# Patient Record
Sex: FEMALE | Race: WHITE | Employment: OTHER | URBAN - NONMETROPOLITAN AREA
[De-identification: names, ages, dates, MRNs, and addresses within clinical notes are randomized per-mention and may not be internally consistent; named-entity substitution may affect disease eponyms.]

---

## 2017-09-27 ENCOUNTER — HOSPITAL ENCOUNTER (OUTPATIENT)
Dept: MAMMOGRAPHY | Age: 62
Discharge: HOME | End: 2017-09-27
Payer: COMMERCIAL

## 2017-09-27 DIAGNOSIS — Z12.31 ENCOUNTER FOR MAMMOGRAM TO ESTABLISH BASELINE MAMMOGRAM: ICD-10-CM

## 2017-09-27 PROCEDURE — G0202 SCR MAMMO BI INCL CAD: HCPCS

## 2022-12-11 ENCOUNTER — OFFICE VISIT (OUTPATIENT)
Dept: PRIMARY CARE CLINIC | Age: 67
End: 2022-12-11
Payer: MEDICARE

## 2022-12-11 ENCOUNTER — HOSPITAL ENCOUNTER (OUTPATIENT)
Age: 67
Discharge: HOME | End: 2022-12-13
Payer: MEDICARE

## 2022-12-11 ENCOUNTER — HOSPITAL ENCOUNTER (OUTPATIENT)
Dept: GENERAL RADIOLOGY | Age: 67
Discharge: HOME | End: 2022-12-13
Payer: MEDICARE

## 2022-12-11 VITALS
HEART RATE: 58 BPM | DIASTOLIC BLOOD PRESSURE: 84 MMHG | BODY MASS INDEX: 21.07 KG/M2 | SYSTOLIC BLOOD PRESSURE: 132 MMHG | RESPIRATION RATE: 16 BRPM | HEIGHT: 68 IN | WEIGHT: 139 LBS | TEMPERATURE: 97.3 F | OXYGEN SATURATION: 99 %

## 2022-12-11 DIAGNOSIS — M25.571 ACUTE RIGHT ANKLE PAIN: ICD-10-CM

## 2022-12-11 DIAGNOSIS — M79.671 RIGHT FOOT PAIN: ICD-10-CM

## 2022-12-11 DIAGNOSIS — M79.671 RIGHT FOOT PAIN: Primary | ICD-10-CM

## 2022-12-11 PROCEDURE — 99212 OFFICE O/P EST SF 10 MIN: CPT | Performed by: NURSE PRACTITIONER

## 2022-12-11 PROCEDURE — 73630 X-RAY EXAM OF FOOT: CPT

## 2022-12-11 RX ORDER — ATENOLOL 50 MG/1
TABLET ORAL
COMMUNITY
Start: 2022-10-10

## 2022-12-11 SDOH — ECONOMIC STABILITY: FOOD INSECURITY: WITHIN THE PAST 12 MONTHS, YOU WORRIED THAT YOUR FOOD WOULD RUN OUT BEFORE YOU GOT MONEY TO BUY MORE.: NEVER TRUE

## 2022-12-11 SDOH — ECONOMIC STABILITY: FOOD INSECURITY: WITHIN THE PAST 12 MONTHS, THE FOOD YOU BOUGHT JUST DIDN'T LAST AND YOU DIDN'T HAVE MONEY TO GET MORE.: NEVER TRUE

## 2022-12-11 ASSESSMENT — PATIENT HEALTH QUESTIONNAIRE - PHQ9
1. LITTLE INTEREST OR PLEASURE IN DOING THINGS: 0
SUM OF ALL RESPONSES TO PHQ QUESTIONS 1-9: 0
SUM OF ALL RESPONSES TO PHQ QUESTIONS 1-9: 0
SUM OF ALL RESPONSES TO PHQ9 QUESTIONS 1 & 2: 0
2. FEELING DOWN, DEPRESSED OR HOPELESS: 0
SUM OF ALL RESPONSES TO PHQ QUESTIONS 1-9: 0
SUM OF ALL RESPONSES TO PHQ QUESTIONS 1-9: 0

## 2022-12-11 ASSESSMENT — SOCIAL DETERMINANTS OF HEALTH (SDOH): HOW HARD IS IT FOR YOU TO PAY FOR THE VERY BASICS LIKE FOOD, HOUSING, MEDICAL CARE, AND HEATING?: NOT HARD AT ALL

## 2022-12-11 NOTE — PROGRESS NOTES
[address] [facility] A department of [facility]  Phone: [phone]  Fax: [phone]      Taylor Willson is a 74 y.o. female who presents to the [facility] or [address] [place] [address] clinic today for her medical conditions/complaints as noted below. Taylor Willson is c/o of Foot Pain (Had a fall yesterday due to foot \"feeling numb\". Happened suddenly and without warning. Able to walk straight, but when foot twists-the pain significantly increases)    ankle pan along lateral aspect malleolus from fall yesterday. Able to bear weight but later movements cause pain. Assessment and Plan     1. Right foot pain  ankle pan along lateral aspect malleolus from fall yesterday. Able to bear weight but later movements cause pain. X rays obtained. Reviewed X-rays with patient. No acute findings. Discussed NSAIDs, and compression socks. Advised to follow up with PCP if no improvement in pain for PT in 2-3 days. - XR FOOT RIGHT (MIN 3 VIEWS); Future      Subjective: Foot was asleep yesterday after sitting for a while and when she went to walk it caused her to fall. No other injuries. Able to walk on the foot today and bear weight but the  lateral motions cause discomfort. Foot Pain   The quality of the pain is described as aching. Associated symptoms include a limited range of motion. The symptoms are aggravated by activity. She has tried NSAIDS and cold for the symptoms. The treatment provided mild relief. Her past medical history is significant for rheumatoid arthritis. RA but not on medications   Review of Systems   Musculoskeletal:  Positive for joint swelling (slight on right later ankle). All other systems reviewed and are negative. Past Medical History:   Past Medical History:   Diagnosis Date    Cancer ([address]     Endometrial, Stage 1.  Had Hysterectomy    Hypertension     RA (rheumatoid arthritis) ([address]         Past Surgical History:  has a past surgical history that includes Tonsillectomy and Hysterectomy, total abdominal (2018). Allergies: No Known Allergies    Social History:  reports that she has never smoked. She has never used smokeless tobacco. She reports that she does not drink alcohol and does not use drugs. Objective:     Vitals:    12/11/22 1256   BP: 132/84   Site: Right Upper Arm   Position: Sitting   Cuff Size: Medium Adult   Pulse: 58   Resp: 16   Temp: 97.3 °F (36.3 °C)   TempSrc: Tympanic   SpO2: 99%   Weight: 139 lb (63 kg)   Height: 5' 8\" (1.727 m)     Body mass index is 21.13 kg/m². /84 (Site: Right Upper Arm, Position: Sitting, Cuff Size: Medium Adult)   Pulse 58   Temp 97.3 °F (36.3 °C) (Tympanic)   Resp 16   Ht 5' 8\" (1.727 m)   Wt 139 lb (63 kg)   LMP 05/01/2014   SpO2 99%   BMI 21.13 kg/m²   Physical Exam  Vitals and nursing note reviewed. Constitutional:       General: She is not in acute distress. Appearance: She is normal weight. She is not ill-appearing. Eyes:      Pupils: Pupils are equal, round, and reactive to light. Cardiovascular:      Rate and Rhythm: Normal rate. Pulses: Normal pulses. Pulmonary:      Effort: Pulmonary effort is normal.   Musculoskeletal:         General: Tenderness present. No swelling. Cervical back: Normal range of motion. Right ankle: Swelling (minimal along lateral aspect of malleolus) present. No lacerations. Tenderness present over the lateral malleolus. No medial malleolus, CF ligament, posterior TF ligament or base of 5th metatarsal tenderness. Normal range of motion. Legs:         Feet:    Skin:     General: Skin is warm and dry. Capillary Refill: Capillary refill takes less than 2 seconds. Neurological:      General: No focal deficit present. Mental Status: She is alert.    Psychiatric:         Mood and Affect: Mood normal.         Behavior: Behavior normal.         Judgment: Judgment normal.     XR FOOT RIGHT (MIN 3 VIEWS)  Narrative: EXAMINATION:  THREE XRAY VIEWS OF THE RIGHT FOOT    12/11/2022 1:28 pm    COMPARISON:  None. HISTORY:  ORDERING SYSTEM PROVIDED HISTORY: Right foot pain  TECHNOLOGIST PROVIDED HISTORY:  ankle pan along lateral aspect malleolus from fall yesterday. Able to bear  weight but later movements cause pain. Reason for Exam: Lateral proximal foot pain/bruising/swelling since she  twisted foot yesterday    60-year-old female with right foot pain after a fall    FINDINGS:  Osseous alignment is normal.  Mild degenerative changes of the 3rd MTP joint. No marginal erosions are identified. No acute fracture or gross dislocation  is seen. The medial and middle cuneiforms demonstrate proper alignment with the base  of the 1st and 2nd metatarsals respectively. No tibiotalar joint effusion is seen. Boehler's angle is maintained. Impression: 1. Mild degenerative changes of the 3rd MTP joint. 2. No acute fracture or dislocation. Discussed exam, POCT findings, plan of care, and follow-up at length with patient and/or their caregiver. Reviewed all prescribed and recommended medications, administration and side effects. Encouraged patient to follow up with PCP or return to the clinic for no improvement and or worsening of symptoms. All questions were addressed and answered with verbalization of understanding. The patient and/or the caregiver was agreeable with the plan.      Electronically signed by LAYNE Rabago CNP on 12/11/2022 at 1:11 PM

## 2023-03-04 LAB
CHOLESTEROL, TOTAL: 234 MG/DL
CHOLESTEROL/HDL RATIO: 3.8
HDLC SERPL-MCNC: 62 MG/DL (ref 35–70)
LDL CHOLESTEROL CALCULATED: 147 MG/DL (ref 0–160)
NONHDLC SERPL-MCNC: ABNORMAL MG/DL
TRIGL SERPL-MCNC: 124 MG/DL
VLDLC SERPL CALC-MCNC: 25 MG/DL

## 2023-09-19 ENCOUNTER — TELEPHONE (OUTPATIENT)
Dept: SURGERY | Age: 68
End: 2023-09-19

## 2023-09-19 NOTE — TELEPHONE ENCOUNTER
Left message for patient offering to mail colonoscopy packet based off patients chart, patient is due for colonoscopy.

## 2023-09-19 NOTE — TELEPHONE ENCOUNTER
Patient returned called letting writer know that she is doctering at [facility] and wishes to stay with them.

## 2023-12-07 ENCOUNTER — COMMUNITY OUTREACH (OUTPATIENT)
Dept: FAMILY MEDICINE CLINIC | Age: 68
End: 2023-12-07

## 2023-12-07 NOTE — PROGRESS NOTES
Care Everywhere audit shows patient had recent lipid and colonoscopy done. Health maintenance has been updated.